# Patient Record
Sex: MALE | Race: WHITE | NOT HISPANIC OR LATINO | URBAN - METROPOLITAN AREA
[De-identification: names, ages, dates, MRNs, and addresses within clinical notes are randomized per-mention and may not be internally consistent; named-entity substitution may affect disease eponyms.]

---

## 2017-06-03 ENCOUNTER — ANESTHESIA - HEALTHEAST (OUTPATIENT)
Dept: EMERGENCY MEDICINE | Facility: HOSPITAL | Age: 57
End: 2017-06-03

## 2021-06-11 NOTE — ANESTHESIA PROCEDURE NOTES
Emergent Intubation  Date/Time: 6/3/2017 10:58 PM  Anesthesiologist: RICARDO MUNIZ  Indications: respiratory distress  Route: oral  Technique: direct (Glidescope)  Laryngoscope size: Mac 3  Tube type: cuffed  Cuff inflated: yes  Level of Difficulty: 0ETT to lip: 23 cm  Tube secured with: adhesive tape  ETCO2 = Yes  Breath sounds: equal  SaO2 %: 93    Sign out given. CXR and sedation per primary care team.  Comments: Attempted intubation by ER staff. Pt actively vomiting without handling secretions on arrival. One look with MAC 3 without view of cords. Intubated immediately with Glidescope without incident.

## 2021-06-16 PROBLEM — E87.1 ACUTE HYPONATREMIA: Status: ACTIVE | Noted: 2017-06-04
